# Patient Record
Sex: MALE | Race: WHITE
[De-identification: names, ages, dates, MRNs, and addresses within clinical notes are randomized per-mention and may not be internally consistent; named-entity substitution may affect disease eponyms.]

---

## 2021-04-05 ENCOUNTER — HOSPITAL ENCOUNTER (EMERGENCY)
Dept: HOSPITAL 76 - ED | Age: 46
Discharge: HOME | End: 2021-04-05
Payer: COMMERCIAL

## 2021-04-05 VITALS — DIASTOLIC BLOOD PRESSURE: 102 MMHG | SYSTOLIC BLOOD PRESSURE: 158 MMHG

## 2021-04-05 DIAGNOSIS — K29.20: Primary | ICD-10-CM

## 2021-04-05 DIAGNOSIS — Z20.822: ICD-10-CM

## 2021-04-05 DIAGNOSIS — F10.20: ICD-10-CM

## 2021-04-05 DIAGNOSIS — R00.0: ICD-10-CM

## 2021-04-05 LAB
ALBUMIN DIAFP-MCNC: 4.3 G/DL (ref 3.2–5.5)
ALBUMIN/GLOB SERPL: 1.6 {RATIO} (ref 1–2.2)
ALP SERPL-CCNC: 75 IU/L (ref 42–121)
ALT SERPL W P-5'-P-CCNC: 95 IU/L (ref 10–60)
ANION GAP SERPL CALCULATED.4IONS-SCNC: 11 MMOL/L (ref 6–13)
AST SERPL W P-5'-P-CCNC: 85 IU/L (ref 10–42)
B PARAPERT DNA SPEC QL NAA+PROBE: NOT DETECTED
B PERT DNA SPEC QL NAA+PROBE: NOT DETECTED
BASOPHILS NFR BLD AUTO: 0 10^3/UL (ref 0–0.1)
BASOPHILS NFR BLD AUTO: 0.7 %
BILIRUB BLD-MCNC: 0.9 MG/DL (ref 0.2–1)
BUN SERPL-MCNC: 8 MG/DL (ref 6–20)
C PNEUM DNA NPH QL NAA+NON-PROBE: NOT DETECTED
CALCIUM UR-MCNC: 9.4 MG/DL (ref 8.5–10.3)
CHLORIDE SERPL-SCNC: 99 MMOL/L (ref 101–111)
CLARITY UR REFRACT.AUTO: CLEAR
CO2 SERPL-SCNC: 28 MMOL/L (ref 21–32)
CREAT SERPLBLD-SCNC: 1.1 MG/DL (ref 0.6–1.2)
EOSINOPHIL # BLD AUTO: 0.1 10^3/UL (ref 0–0.7)
EOSINOPHIL NFR BLD AUTO: 1.6 %
ERYTHROCYTE [DISTWIDTH] IN BLOOD BY AUTOMATED COUNT: 11.9 % (ref 12–15)
ETHANOL BLD-MCNC: 227.9 MG/DL
FLUAV RNA RESP QL NAA+PROBE: NOT DETECTED
GFRSERPLBLD MDRD-ARVRAT: 73 ML/MIN/{1.73_M2} (ref 89–?)
GLOBULIN SER-MCNC: 2.7 G/DL (ref 2.1–4.2)
GLUCOSE SERPL-MCNC: 113 MG/DL (ref 70–100)
GLUCOSE UR QL STRIP.AUTO: NEGATIVE MG/DL
HAEM INFLU B DNA SPEC QL NAA+PROBE: NOT DETECTED
HCOV 229E RNA SPEC QL NAA+PROBE: NOT DETECTED
HCOV HKU1 RNA UPPER RESP QL NAA+PROBE: NOT DETECTED
HCOV NL63 RNA ASPIRATE QL NAA+PROBE: NOT DETECTED
HCOV OC43 RNA SPEC QL NAA+PROBE: NOT DETECTED
HCT VFR BLD AUTO: 51.4 % (ref 42–52)
HGB UR QL STRIP: 17.8 G/DL (ref 14–18)
HMPV AG SPEC QL: NOT DETECTED
HPIV1 RNA NPH QL NAA+PROBE: NOT DETECTED
HPIV2 SPEC QL CULT: NOT DETECTED
HPIV3 AB TITR SER CF: NOT DETECTED {TITER}
HPIV4 RNA SPEC QL NAA+PROBE: NOT DETECTED
KETONES UR QL STRIP.AUTO: NEGATIVE MG/DL
LIPASE SERPL-CCNC: 29 U/L (ref 22–51)
LYMPHOCYTES # SPEC AUTO: 1.2 10^3/UL (ref 1.5–3.5)
LYMPHOCYTES NFR BLD AUTO: 26.5 %
M PNEUMO DNA SPEC QL NAA+PROBE: NOT DETECTED
MAGNESIUM SERPL-MCNC: 1.8 MG/DL (ref 1.7–2.8)
MCH RBC QN AUTO: 32.1 PG (ref 27–31)
MCHC RBC AUTO-ENTMCNC: 34.6 G/DL (ref 32–36)
MCV RBC AUTO: 92.6 FL (ref 80–94)
MONOCYTES # BLD AUTO: 0.4 10^3/UL (ref 0–1)
MONOCYTES NFR BLD AUTO: 7.9 %
NEUTROPHILS # BLD AUTO: 2.8 10^3/UL (ref 1.5–6.6)
NEUTROPHILS # SNV AUTO: 4.4 X10^3/UL (ref 4.8–10.8)
NEUTROPHILS NFR BLD AUTO: 62.8 %
NITRITE UR QL STRIP.AUTO: NEGATIVE
NRBC # BLD AUTO: 0 /100WBC
NRBC # BLD AUTO: 0 X10^3/UL
PDW BLD AUTO: 8.4 FL (ref 7.4–11.4)
PH UR STRIP.AUTO: 8.5 PH (ref 5–7.5)
PHOSPHATE BLD-MCNC: 3.5 MG/DL (ref 2.5–4.6)
PLATELET # BLD: 208 10^3/UL (ref 130–450)
POTASSIUM SERPL-SCNC: 4.2 MMOL/L (ref 3.5–5)
PROT SPEC-MCNC: 7 G/DL (ref 6.7–8.2)
PROT UR STRIP.AUTO-MCNC: NEGATIVE MG/DL
RBC # UR STRIP.AUTO: NEGATIVE /UL
RBC MAR: 5.55 10^6/UL (ref 4.7–6.1)
RSV RNA RESP QL NAA+PROBE: NOT DETECTED
RV+EV RNA SPEC QL NAA+PROBE: NOT DETECTED
SARS-COV-2 RNA PNL SPEC NAA+PROBE: NOT DETECTED
SODIUM SERPLBLD-SCNC: 138 MMOL/L (ref 135–145)
SP GR UR STRIP.AUTO: 1.02 (ref 1–1.03)
UROBILINOGEN UR QL STRIP.AUTO: (no result) E.U./DL
UROBILINOGEN UR STRIP.AUTO-MCNC: NEGATIVE MG/DL

## 2021-04-05 PROCEDURE — 83690 ASSAY OF LIPASE: CPT

## 2021-04-05 PROCEDURE — 93005 ELECTROCARDIOGRAM TRACING: CPT

## 2021-04-05 PROCEDURE — 85025 COMPLETE CBC W/AUTO DIFF WBC: CPT

## 2021-04-05 PROCEDURE — 84100 ASSAY OF PHOSPHORUS: CPT

## 2021-04-05 PROCEDURE — 96374 THER/PROPH/DIAG INJ IV PUSH: CPT

## 2021-04-05 PROCEDURE — 80053 COMPREHEN METABOLIC PANEL: CPT

## 2021-04-05 PROCEDURE — 87086 URINE CULTURE/COLONY COUNT: CPT

## 2021-04-05 PROCEDURE — 96375 TX/PRO/DX INJ NEW DRUG ADDON: CPT

## 2021-04-05 PROCEDURE — 0202U NFCT DS 22 TRGT SARS-COV-2: CPT

## 2021-04-05 PROCEDURE — 36415 COLL VENOUS BLD VENIPUNCTURE: CPT

## 2021-04-05 PROCEDURE — 81001 URINALYSIS AUTO W/SCOPE: CPT

## 2021-04-05 PROCEDURE — 99284 EMERGENCY DEPT VISIT MOD MDM: CPT

## 2021-04-05 PROCEDURE — 83735 ASSAY OF MAGNESIUM: CPT

## 2021-04-05 PROCEDURE — 81003 URINALYSIS AUTO W/O SCOPE: CPT

## 2021-04-05 PROCEDURE — 80320 DRUG SCREEN QUANTALCOHOLS: CPT

## 2021-04-05 PROCEDURE — 84484 ASSAY OF TROPONIN QUANT: CPT

## 2021-04-05 NOTE — ED PHYSICIAN DOCUMENTATION
History of Present Illness





- Stated complaint


Stated Complaint: NAUSEA/DIZZINESS





- Chief complaint


Chief Complaint: General





- History obtained from


History obtained from: Patient





- History of Present Illness


Pain level max: 0


Pain level now: 0





- Additonal information


Additional information: 





Patient is a 45-year-old male who presents to the emergency department the chief

complaint of "not feeling well".  Has not been feeling well for the past few 

days, worsening today.  Has had nausea and dry heaves.  No fevers.  Occasional 

chills.  Minimal sore throat.  Some rhinorrhea and congestion.  Had his first 

Covid vaccination shot about 3 weeks ago.  Does drink alcohol nightly.  Has been

trying to cut back on his alcohol consumption.  Did have alcohol last night, but

states it did not help his symptoms.  No chest pain.  No shortness of breath. 

Worse with eating and drinking, nothing makes it better





Review of Systems


Ten Systems: 10 systems reviewed and negative


Constitutional: denies: Fever, Chills


Ears: denies: Ear pain


Nose: denies: Rhinorrhea / runny nose, Congestion


Respiratory: denies: Cough


: denies: Dysuria


Skin: denies: Rash


Musculoskeletal: denies: Neck pain, Back pain


Neurologic: denies: Headache





PD PAST MEDICAL HISTORY





- Past Medical History


Past Medical History: No





- Past Surgical History


Past Surgical History: Yes


General: Appendectomy





- Present Medications


Home Medications: 


                                Ambulatory Orders











 Medication  Instructions  Recorded  Confirmed


 


Esomeprazole Magnesium [Nexium] 40 mg PO DAILY #30 cap 04/05/21 


 


Famotidine [Pepcid] 20 mg PO BID #60 tablet 04/05/21 


 


LORazepam [Ativan] 0.5 mg PO BID PRN #6 tablet 04/05/21 


 


Ondansetron Odt [Zofran] 4 mg TL Q6H PRN #10 tablet 04/05/21 


 


Sucralfate [Carafate] 1 gm PO ACHS #60 tablet 04/05/21 














- Allergies


Allergies/Adverse Reactions: 


                                    Allergies











Allergy/AdvReac Type Severity Reaction Status Date / Time


 


No Known Drug Allergies Allergy   Verified 04/05/21 11:08














- Social History


Does the pt smoke?: No


Smoking Status: Never smoker


Does the pt drink ETOH?: Yes


ETOH Use: Liquor


Does the pt have substance abuse?: Yes


Substance Use and Type: CBD oil / Products





- Immunizations


Immunizations are current?: Yes





- POLST


Patient has POLST: No





PD ED PE NORMAL





- Vitals


Vital signs reviewed: Yes





- General


General: Alert and oriented X 3, No acute distress





- HEENT


HEENT: PERRL, EOMI, Ears normal, Moist mucous membranes, Pharynx benign





- Neck


Neck: Supple, no meningeal sign, No adenopathy





- Cardiac


Cardiac: RRR, Strong equal pulses





- Respiratory


Respiratory: No respiratory distress, Clear bilaterally





- Abdomen


Abdomen: Soft, Non tender, Non distended





- Derm


Derm: Warm and dry, No rash





- Extremities


Extremities: No edema, No calf tenderness / cord





- Neuro


Neuro: Alert and oriented X 3





- Psych


Psych: Normal mood, Normal affect





Results





- Vitals


Vitals: 


                               Vital Signs - 24 hr











  04/05/21 04/05/21 04/05/21





  11:08 11:37 14:51


 


Temperature 36.7 C  


 


Heart Rate 108 H 95 79


 


Respiratory 18 16 187 H





Rate   


 


Blood Pressure 131/101 H 157/94 H 164/98 H


 


O2 Saturation 96 96 100














  04/05/21 04/05/21





  15:30 16:26


 


Temperature  36.6 C


 


Heart Rate 94 77


 


Respiratory 15 17





Rate  


 


Blood Pressure 171/106 H 158/102 H


 


O2 Saturation 99 98








                                     Oxygen











O2 Source                      Room air

















- EKG (time done)


  ** 1314


Rate: Rate (enter#) (106)


Rhythm: Sinus tachycardia


Axis: Normal


Intervals: Normal WI


QRS: Normal


Ischemia: ST elevation c/w repol





- Labs


Labs: 


                                Laboratory Tests











  04/05/21 04/05/21 04/05/21





  12:46 12:46 12:48


 


WBC  4.4 L  


 


RBC  5.55  


 


Hgb  17.8  


 


Hct  51.4  


 


MCV  92.6  


 


MCH  32.1 H  


 


MCHC  34.6  


 


RDW  11.9 L  


 


Plt Count  208  


 


MPV  8.4  


 


Neut # (Auto)  2.8  


 


Lymph # (Auto)  1.2 L  


 


Mono # (Auto)  0.4  


 


Eos # (Auto)  0.1  


 


Baso # (Auto)  0.0  


 


Absolute Nucleated RBC  0.00  


 


Nucleated RBC %  0.0  


 


Sodium   138 


 


Potassium   4.2 


 


Chloride   99 L 


 


Carbon Dioxide   28 


 


Anion Gap   11.0 


 


BUN   8 


 


Creatinine   1.1 


 


Estimated GFR (MDRD)   73 L 


 


Glucose   113 H 


 


Calcium   9.4 


 


Phosphorus   3.5 


 


Magnesium   1.8 


 


Total Bilirubin   0.9 


 


AST   85 H 


 


ALT   95 H 


 


Alkaline Phosphatase   75 


 


Troponin I High Sens    3.2


 


Total Protein   7.0 


 


Albumin   4.3 


 


Globulin   2.7 


 


Albumin/Globulin Ratio   1.6 


 


Lipase   29 


 


Urine Color   


 


Urine Clarity   


 


Urine pH   


 


Ur Specific Gravity   


 


Urine Protein   


 


Urine Glucose (UA)   


 


Urine Ketones   


 


Urine Occult Blood   


 


Urine Nitrite   


 


Urine Bilirubin   


 


Urine Urobilinogen   


 


Ur Leukocyte Esterase   


 


Ur Microscopic Review   


 


Urine Culture Comments   


 


Nasal Adenovirus (PCR)   


 


Nasal B. parapertussis DNA (PCR)   


 


Nasal Coronavir 229E PCR   


 


Nasal Coronavir HKU1 PCR   


 


Nasal Coronavir NL63 PCR   


 


Nasal Coronavir OC43 PCR   


 


Nasal Enterovir/Rhinovir PCR   


 


Nasal Influenza B PCR   


 


Nasal Influenza A PCR   


 


Nasal Parainfluen 1 PCR   


 


Nasal Parainfluen 2 PCR   


 


Nasal Parainfluen 3 PCR   


 


Nasal Parainfluen 4 PCR   


 


Nasal RSV (PCR)   


 


Nasal B.pertussis DNA PCR   


 


Nasal C.pneumoniae (PCR)   


 


Giuseppe Human Metapneumo PCR   


 


Nasal M.pneumoniae (PCR)   


 


Nasal SARS-CoV-2 (PCR)   


 


Ethyl Alcohol   227.9 














  04/05/21 04/05/21





  13:13 14:10


 


WBC  


 


RBC  


 


Hgb  


 


Hct  


 


MCV  


 


MCH  


 


MCHC  


 


RDW  


 


Plt Count  


 


MPV  


 


Neut # (Auto)  


 


Lymph # (Auto)  


 


Mono # (Auto)  


 


Eos # (Auto)  


 


Baso # (Auto)  


 


Absolute Nucleated RBC  


 


Nucleated RBC %  


 


Sodium  


 


Potassium  


 


Chloride  


 


Carbon Dioxide  


 


Anion Gap  


 


BUN  


 


Creatinine  


 


Estimated GFR (MDRD)  


 


Glucose  


 


Calcium  


 


Phosphorus  


 


Magnesium  


 


Total Bilirubin  


 


AST  


 


ALT  


 


Alkaline Phosphatase  


 


Troponin I High Sens  


 


Total Protein  


 


Albumin  


 


Globulin  


 


Albumin/Globulin Ratio  


 


Lipase  


 


Urine Color  YELLOW 


 


Urine Clarity  CLEAR 


 


Urine pH  8.5 H 


 


Ur Specific Gravity  1.020 


 


Urine Protein  NEGATIVE 


 


Urine Glucose (UA)  NEGATIVE 


 


Urine Ketones  NEGATIVE 


 


Urine Occult Blood  NEGATIVE 


 


Urine Nitrite  NEGATIVE 


 


Urine Bilirubin  NEGATIVE 


 


Urine Urobilinogen  0.2 (NORMAL) 


 


Ur Leukocyte Esterase  NEGATIVE 


 


Ur Microscopic Review  NOT INDICATED 


 


Urine Culture Comments  NOT INDICATED 


 


Nasal Adenovirus (PCR)   NOT DETECTED


 


Nasal B. parapertussis DNA (PCR)   NOT DETECTED


 


Nasal Coronavir 229E PCR   NOT DETECTED


 


Nasal Coronavir HKU1 PCR   NOT DETECTED


 


Nasal Coronavir NL63 PCR   NOT DETECTED


 


Nasal Coronavir OC43 PCR   NOT DETECTED


 


Nasal Enterovir/Rhinovir PCR   NOT DETECTED


 


Nasal Influenza B PCR   NOT DETECTED


 


Nasal Influenza A PCR   NOT DETECTED


 


Nasal Parainfluen 1 PCR   NOT DETECTED


 


Nasal Parainfluen 2 PCR   NOT DETECTED


 


Nasal Parainfluen 3 PCR   NOT DETECTED


 


Nasal Parainfluen 4 PCR   NOT DETECTED


 


Nasal RSV (PCR)   NOT DETECTED


 


Nasal B.pertussis DNA PCR   NOT DETECTED


 


Nasal C.pneumoniae (PCR)   NOT DETECTED


 


Giuseppe Human Metapneumo PCR   NOT DETECTED


 


Nasal M.pneumoniae (PCR)   NOT DETECTED


 


Nasal SARS-CoV-2 (PCR)   NOT DETECTED


 


Ethyl Alcohol  














PD MEDICAL DECISION MAKING





- ED course


Complexity details: reviewed results, re-evaluated patient, considered 

differential, d/w patient


ED course: 





Patient is well-appearing, nontoxic.  Afebrile.  Likely has alcoholic gastritis.

  Tolerating p.o. without difficulty after nausea medications.  Feels better 

after IV fluids.  Will place on a PPI, H2 blocker and Carafate for home.  Social

 work met with the patient and gave resources for alcoholism.  Otherwise normal 

laboratory testing.  Patient counseled regarding signs and symptoms for which I 

believe and urgent re-evaluation would be necessary. Patient with good 

understanding of and agreement to plan and is comfortable going home at this 

time





This document was made in part using voice recognition software. While efforts 

are made to proofread this document, sound alike and grammatical errors may 

occur.





Departure





- Departure


Disposition: 01 Home, Self Care


Clinical Impression: 


Gastritis


Qualifiers:


 Gastritis type: alcoholic Chronicity: acute Gastritis bleeding: without 

bleeding Qualified Code(s): K29.20 - Alcoholic gastritis without bleeding





Condition: Good


Instructions:  ED PUD Vs Gastritis


Follow-Up: 


your,doctor in  1week [Other]


Prescriptions: 


LORazepam [Ativan] 0.5 mg PO BID PRN #6 tablet


 PRN Reason: Anxiety


Sucralfate [Carafate] 1 gm PO ACHS #60 tablet


Esomeprazole Magnesium [Nexium] 40 mg PO DAILY #30 cap


Famotidine [Pepcid] 20 mg PO BID #60 tablet


Ondansetron Odt [Zofran] 4 mg TL Q6H PRN #10 tablet


 PRN Reason: Nausea / Vomiting


Comments: 


Your testing does not show any acute abnormalities today.  You are likely 

suffering from gastritis secondary to alcohol use.  You should follow-up with 

your doctor for an endoscopy.  Return if you worsen.  Drink plenty of water at 

home.  You can follow-up with the resources given to you by social work today  

for help with your alcohol use.


Discharge Date/Time: 04/05/21 16:38

## 2021-04-07 NOTE — EXTERNAL MEDICAL SUMMARY RPT
Continuity of Care Document

                            Created on:2021



Patient:MARIPOSA CONNELLY

Sex:Male

:1975

External Reference #:[id number]





Demographics







                          Phone                     Unavailable

 

                          Preferred Language        Unknown

 

                          Marital Status            Unknown

 

                          Zoroastrian Affiliation     Unknown

 

                          Race                      Unknown

 

                          Ethnic Group              Unknown









Author







                          Organization              [facility]

 

                          [place] [address]

 

                          Phone                     [phone]









Social History







                     date                description         facility

 

                     [id number]

## 2022-11-07 ENCOUNTER — HOSPITAL ENCOUNTER (EMERGENCY)
Dept: HOSPITAL 76 - ED | Age: 47
Discharge: HOME | End: 2022-11-07
Payer: COMMERCIAL

## 2022-11-07 VITALS — SYSTOLIC BLOOD PRESSURE: 146 MMHG | DIASTOLIC BLOOD PRESSURE: 99 MMHG

## 2022-11-07 DIAGNOSIS — M70.42: Primary | ICD-10-CM

## 2022-11-07 PROCEDURE — 99282 EMERGENCY DEPT VISIT SF MDM: CPT

## 2022-11-07 PROCEDURE — 99283 EMERGENCY DEPT VISIT LOW MDM: CPT

## 2022-11-07 NOTE — XRAY REPORT
PROCEDURE:  Knee 4 View LT

 

INDICATIONS:  Trauma

 

TECHNIQUE:  4 views of the left knee(s) were acquired.  

 

COMPARISON:  None.

 

FINDINGS:  

 

Bones:  No fractures or dislocations.  No suspicious bony lesions.  

 

Soft tissues:  Moderate joint effusion.  No suspicious soft tissue calcifications.  

 

 

IMPRESSION:  Moderate effusion. No visualized acute fracture or dislocation. However, occult injury c
annot be excluded. Recommend short interval imaging follow-up in 7-10 days as clinically indicated fo
r additional evaluation.

 

Reviewed by: Di Mejias MD on 11/7/2022 9:24 AM PST

Approved by: Di Mejias MD on 11/7/2022 9:24 AM PST

 

 

Station ID:  [id number]

## 2022-11-07 NOTE — ED PHYSICIAN DOCUMENTATION
History of Present Illness





- Stated complaint


Stated Complaint: LT KNEE SWELLING





- Chief complaint


Chief Complaint: Ext Problem





- Additonal information


Additional information: 


Patient 46-year-old Male presenting to the emergency department with left knee 

swelling ongoing x4 weeks.  [place] [email or web address] 

[place]  Has been able to ambulate since.  Reports increased swelling to the 

anterior patella.  States has been having difficulty with his regular workout 

routine, specifically things that involve the left knee such as lunges.  Has not

seen primary care or orthopedics.








Review of Systems


Ten Systems: 10 systems reviewed and negative


Constitutional: denies: Fever


Eyes: denies: Loss of vision


Ears: denies: Loss of hearing


Nose: denies: Rhinorrhea / runny nose


Throat: denies: Dental pain / toothache


Cardiac: denies: Chest pain / pressure


Respiratory: denies: Dyspnea


GI: denies: Abdominal Pain


: denies: Dysuria





PD PAST MEDICAL HISTORY





- Past Surgical History


Past Surgical History: Yes


General: Appendectomy





- Present Medications


Home Medications: 


                                Ambulatory Orders











 Medication  Instructions  Recorded  Confirmed


 


Esomeprazole Magnesium [Nexium] 40 mg PO DAILY #30 cap 04/05/21 


 


Famotidine [Pepcid] 20 mg PO BID #60 tablet 04/05/21 


 


LORazepam [Ativan] 0.5 mg PO BID PRN #6 tablet 04/05/21 


 


Ondansetron Odt [Zofran] 4 mg TL Q6H PRN #10 tablet 04/05/21 


 


Sucralfate [Carafate] 1 gm PO ACHS #60 tablet 04/05/21 














- Allergies


Allergies/Adverse Reactions: 


                                    Allergies











Allergy/AdvReac Type Severity Reaction Status Date / Time


 


No Known Drug Allergies Allergy   Verified 11/07/22 08:15














- Social History


Does the pt smoke?: No


Smoking Status: Never smoker


Does the pt drink ETOH?: Yes


Does the pt have substance abuse?: Yes





- Immunizations


Immunizations are current?: Yes





- POLST


Patient has POLST: No





PD ED PE NORMAL





- General


General: Alert and oriented X 3, No acute distress





- HEENT


HEENT: Atraumatic





- Neck


Neck: Supple, no meningeal sign





- Respiratory


Respiratory: No respiratory distress





- Extremities


Extremities: Other (There is soft tissue swelling to the anterior aspect of the 

patella consistent with a prepatellar bursitis.  There is no joint instability. 

 Patellar pulses palpable.  There is no erythema or rubor that would be 

suggestive of infection.)





Results





- Vitals


Vitals: 





                               Vital Signs - 24 hr











  11/07/22





  08:13


 


Temperature 36.0 C L


 


Heart Rate 64


 


Respiratory 16





Rate 


 


Blood Pressure 146/99 H


 


O2 Saturation 100








                                     Oxygen











O2 Source                      Room air

















PD MEDICAL DECISION MAKING





- ED course


Complexity details: reviewed results, d/w patient


ED course: 


Patient 46-year-old male presenting with 4-week history anterior left knee 

swelling.  Neurovascularly intact.  X-rays negative for fracture.  No joint 

instability.  Overall has swelling to the prepatellar bursa.  Provided Ace wrap 

for compression. Nothing in his physical presentation is consistent or 

concerning for septic arthritis or septicBursitis. Will prescribe nonsteroidal 

anti-inflammatory medications and refer to primary care/orthopedics as needed.








Departure





- Departure


Disposition: 01 Home, Self Care


Clinical Impression: 


 Prepatellar bursitis





Instructions:  ED Bursitis


Follow-Up: 


David Cisneros MD [Provider Admit Priv/Credential] - 


Paige Sandoval ARNP [Provider Admit Priv/Credential] - 


Comments: 


Thank you for allowing us to care for you in the emergency department at 

[facility].





Today in the emergency department your evaluated for any possible life-

threatening medical emergency.





The x-rays taken today did not show any fracture.  The swelling you are having 

to your anterior knee is consistent with what is known as a prepatellar 

bursitis.  This is a relatively benign condition however will require follow-up 

with your primary care doctor and if persistent may require follow-up with an 

orthopedic specialist.  I have included contact information for both a local 

area ARNP With home you can establish yourself or primary care.  Of also 

included contact information for Dr. Hernandez addition, local area orthopod for your

use as needed.  In the interim I recommend Ace wraps and compression to your 

left knee.  Knee sleeves can also be purchased at most drug stores and can 

provide adequate compression. Monitor your any carefully for any worsening pain,

swelling, heat or redness that could indicate infection.  You can use 

over-the-counter ibuprofen and acetaminophen for pain control.





If it anytime you have any new or worsening symptoms please not hesitate to 

return.